# Patient Record
Sex: FEMALE | Race: WHITE | Employment: OTHER | URBAN - METROPOLITAN AREA
[De-identification: names, ages, dates, MRNs, and addresses within clinical notes are randomized per-mention and may not be internally consistent; named-entity substitution may affect disease eponyms.]

---

## 2022-10-21 NOTE — PROGRESS NOTES
[facility]  [address] [facility] Satish Bowden, [address] [place] [address] Court, [address] [facility]  [phone]    Patient Name:  Julian Carrillo  YOB: 1960      Office Visit 10/24/2022    CHIEF COMPLAINT:    Chief Complaint   Patient presents with    New Patient       HISTORY OF PRESENT ILLNESS:      The patient present  for management of obstructive sleep apnea. The patient underwent a diagnostic polysomnography 10 years ago at [facility] when she lived in [address] [place] [address]. At that time she had a split-night study which indicated she had severe sleep apnea with AHI of 76.5/hour and RDI of 85.9/hour. Lowest oxygen saturation was 87%. Subsequently she was titrated to CPAP up to 9 cm. The patient is using and benefiting from her CPAP on a daily basis on her download indicated 100% compliance. She has been using the AirTouch mask and using the blue cloth cover to help with minimize air leak. Her machine is broken beyond repair and needs urgent replacement. She indicated that the symptoms of sleep apnea including snoring and witnessed apnea and headaches and waking up with a dry mouth most of which have improved since she is still using the CPAP. She also was dealing with trouble concentrating and remembering and was diagnosed with the ADD for which she has been using Adderall. This has improved somewhat. She indicated that she go to sleep around 10 PM and wake up around 5 AM.  She has no problem with initiating or maintaining sleep while she is on the CPAP. Since her CPAP broke down she has multiple awakening and she is extremely anxious about going to sleep because of fear of dying when she is sleeping. The Essex score  today is 15 out of 24 indicating significant daytime sleepiness. There is no history of cataplexy, hypnagogic hallucinations, or sleep paralysis.   In addition, there is no history of frequent leg movements, kicking at night, or an inability to keep the legs still.  She takes a nap in the afternoon in general.  She used to work as a  before [employer] pandemic. She is trying to get established with the primary care physician in this area but has a great difficulty doing so. Sleepiness Scale:               History reviewed. No pertinent past medical history. Patient Active Problem List   Diagnosis    VARUN on CPAP    Hypersomnia    RLS (restless legs syndrome)         History reviewed. No pertinent surgical history. [unfilled]    Social History     Socioeconomic History    Marital status: Single     Spouse name: Not on file    Number of children: Not on file    Years of education: Not on file    Highest education level: Not on file   Occupational History    Not on file   Tobacco Use    Smoking status: Former     Packs/day: 0.50     Years: 40.00     Pack years: 20.00     Types: Cigarettes     Quit date: 2019     Years since quittin.8    Smokeless tobacco: Never   Substance and Sexual Activity    Alcohol use: Not Currently    Drug use: Never    Sexual activity: Not on file   Other Topics Concern    Not on file   Social History Narrative    Not on file     Social Determinants of Health     Financial Resource Strain: Not on file   Food Insecurity: Not on file   Transportation Needs: Not on file   Physical Activity: Not on file   Stress: Not on file   Social Connections: Not on file   Intimate Partner Violence: Not on file   Housing Stability: Not on file         History reviewed. No pertinent family history. Allergies   Allergen Reactions    Penicillins Other (See Comments)     Per patients parents         Current Outpatient Medications   Medication Sig    ALPRAZolam (XANAX) 0.5 MG tablet Take 0.5 mg by mouth daily as needed. amphetamine-dextroamphetamine (ADDERALL) 20 MG tablet Take 1 tablet by mouth daily. celecoxib (CELEBREX) 200 MG capsule Take 200 mg by mouth daily    gabapentin (NEURONTIN) 300 MG capsule Take by mouth. traMADol (ULTRAM) 50 MG tablet Take 50 mg by mouth every 6 hours as needed. No current facility-administered medications for this visit. REVIEW OF SYSTEMS:   CONSTITUTIONAL:   There is no history of fever, chills, night sweats, weight loss, weight gain, persistent fatigue, or lethargy/hypersomnolence. EYES:   Denies problems with eye pain, erythema, blurred vision, or visual field loss. ENTM:   Denies history of tinnitus, epistaxis, sore throat, hoarseness, or dysphonia. LYMPH:   Denies swollen glands. CARDIAC:   No chest pain, pressure, discomfort, palpitations, orthopnea, murmurs, or edema. GI:   No dysphagia, heartburn reflux, nausea/vomiting, diarrhea, abdominal pain, or bleeding. :   Denies history of dysuria, hematuria, polyuria, or decreased urine output. MS:   No history of myalgias, arthralgias, bone pain, or muscle cramps. SKIN:   No history of rashes, jaundice, cyanosis, nodules, or ulcers. ENDO:   Negative for heat or cold intolerance. No history of DM. PSYCH:   Negative for anxiety, depression, insomnia, hallucinations. NEURO:   There is no history of AMS, persistent headache, decreased level of consciousness, seizures, or motor or sensory deficits. PHYSICAL EXAM:    Vitals:    10/24/22 1519   BP: 128/76   Pulse: 72   Resp: 18   Temp: 97.4 °F (36.3 °C)   SpO2: 98%        GENERAL APPEARANCE:   The patient is normal weight and in no respiratory distress. HEENT:   PERRL. Conjunctivae unremarkable. Nasal mucosa is without epistaxis, exudate, or polyps. Gums and dentition are unremarkable. There is severe oropharyngeal narrowing. She is Mallampati 4   NECK/LYMPHATIC:   Symmetrical with no elevation of jugular venous pulsation. Trachea midline. No thyroid enlargement. No cervical adenopathy. LUNGS:   Normal respiratory effort with symmetrical lung expansion. Breath sounds are diminished in the bases. HEART:   There is a regular rate and rhythm.   No murmur, rub, or gallop. There is no edema in the lower extremities. ABDOMEN:   Soft and non-tender. No hepatosplenomegaly. Bowel sounds are normal.     SKIN:   There are no rashes, cyanosis, jaundice, or ecchymosis present. EXTREMITIES:   The extremities are unremarkable without clubbing, cyanosis, joint inflammation, degenerative, or ischemic change. MUSCULOSKELETAL:   There is no abnormal tone, muscle atrophy, or abnormal movement present. NEURO:   The patient is alert and oriented to person, place, and time. Memory appears intact and mood is normal.  No gross sensorimotor deficits are present. DIAGNOSTIC TESTS:      8/13/2012          ASSESSMENT:  (Medical Decision Making)         ICD-10-CM    1. VARUN on CPAP , very severe and improved the CPAP at 19 cm. Her machine is broken beyond repair and needs replacement. Replacement machine will be ordered with the setting of 18-20 cm with EPR 3    She would be established with a local DME provider for mask fitting and CPAP set up urgently. The pathophysiology of obstructive sleep apnea was reviewed with the patient. G47.33 DME - DURABLE MEDICAL EQUIPMENT    Z99.89       2. Hypersomnia , likely related to suboptimal treatment of her sleep apnea at this point along with the poor quality of sleep from sleep disruption G47.10 DME - DURABLE MEDICAL EQUIPMENT      3. RLS (restless legs syndrome), this being treated with gabapentin and tramadol as needed. Patient is very cautious about using the medication and she does not use them concurrently. I discussed with her the significant drug interaction between tramadol and several adjunct other agent including antianxiety such as SSRI and SNRI and dextromethorphan which can promote serotonin syndrome. G25.81              PLAN:    Replacement machine for this patient will be ordered with the setting of 18-20 cm with EPR 3. Ramp time will be 15 minutes of starting a centimeter.     Proper sleep hygiene and positional therapy are recommended    She would be set up for urgent appointment with the DME company for mask fitting and CPAP set up    Appropriate handout regarding sleep hygiene, sleep education and CPAP are provided to the patient for further review    Should be tentatively scheduled for follow-up in the sleep center in 3 months or sooner if needed    All questions and concerns were addressed properly      Orders Placed This Encounter   Procedures    DME - [address] [facility]    [facility]  Phone: [phone] [address]  Dept: [phone]      Patient Name: Austin Clifton  : 1960  Gender: female  Address: [address] [place]   Patient phone number: [phone] (home)       Primary Insurance: Payor: [address] [place] [address] Ave / Plan: [facility] / Product Type: *No Product type* /   Subscriber ID: [id number] - (Commercial)      AMB Supply Order  Order Details     DME Location:     Order Date: 10/24/2022   Diagnoses of VARUN on CPAP, Hypersomnia, and RLS (restless legs syndrome) were pertinent to this visit. (  X   )New Set-Up (replacement machine)       machine   (     )[id number] CPAP Unit  (  x   ) Auto CPAP Unit  (     ) BiLevel Unit  (     ) Auto BiLevel Unit  (     ) ASV   (     ) Bilevel ST    (     ) Oxygen Concentrator         Length of need: 12 months    Pressure: 18-20  cmH20  EPR: 3     Starting Ramp Pressure:  8 cm H20  Ramp Time: 15 min       Patient had a diagnostic Apnea Hypopnea Index (AHI) of :       *SUPPLIES* Replace all as needed, or per coverage guidelines     Masks Type: patient uses AirTouch mask.     (  x   ) -Full Face Mask (1 per 3 mon)  ( x    ) -Full Mask (1 per month) Interface/Cushion      (     ) -Nasal Mask (1 per 3 mon)  (     ) - Nasal Mask (1 per month) Interface/Cushion  (     ) -Pillow (2 per mon)  (     ) -Zbuxggkwo (1 per 6 mon)      _________________________________________________________________          Other Supplies:    (  X   )-Pqqtpxuh (1 per 6 mon)  ( X    )-Cdfcup Tubing (1 per 3 mon)  (  X   )- Disposable Filter (2 per mon)  (   X  )-Bvmbis Humidifier (1 per year)     (  x   )-Gmtqsxhyn (sometimes used with Full Face Mask) (1 per 6 mos)  (     )-Tubing-without heat (1 per 3 mos)  ( X   )-Non-Disposable Filter (1 per 6 mos)  (   x  )-Water Chamber (1 per 6 mos)  (     )-Humidifier non-heated (1 per 5 yrs)      Signed Date: 10/24/2022  Electronically Signed By: Mariusz Acevedo MD               No orders of the defined types were placed in this encounter. Over 50% of today's office visit was spent in face to face time reviewing test results, prognosis, importance of compliance, education about disease process, benefits of medications, instructions for management of acute flare-ups, and follow up plans. Total face to face time spent with patient was 45 minutes.     Mariusz Acevedo MD  Electronically signed

## 2022-10-24 ENCOUNTER — OFFICE VISIT (OUTPATIENT)
Dept: SLEEP MEDICINE | Age: 62
End: 2022-10-24
Payer: COMMERCIAL

## 2022-10-24 VITALS
BODY MASS INDEX: 38.03 KG/M2 | TEMPERATURE: 97.4 F | OXYGEN SATURATION: 98 % | WEIGHT: 242.3 LBS | RESPIRATION RATE: 18 BRPM | DIASTOLIC BLOOD PRESSURE: 76 MMHG | HEIGHT: 67 IN | SYSTOLIC BLOOD PRESSURE: 128 MMHG | HEART RATE: 72 BPM

## 2022-10-24 DIAGNOSIS — G25.81 RLS (RESTLESS LEGS SYNDROME): ICD-10-CM

## 2022-10-24 DIAGNOSIS — G47.33 OSA ON CPAP: ICD-10-CM

## 2022-10-24 DIAGNOSIS — G47.10 HYPERSOMNIA: ICD-10-CM

## 2022-10-24 DIAGNOSIS — Z99.89 OSA ON CPAP: ICD-10-CM

## 2022-10-24 PROCEDURE — 99204 OFFICE O/P NEW MOD 45 MIN: CPT | Performed by: INTERNAL MEDICINE

## 2022-10-24 RX ORDER — GABAPENTIN 300 MG/1
CAPSULE ORAL
COMMUNITY
Start: 2022-04-18

## 2022-10-24 RX ORDER — DEXTROAMPHETAMINE SACCHARATE, AMPHETAMINE ASPARTATE, DEXTROAMPHETAMINE SULFATE AND AMPHETAMINE SULFATE 5; 5; 5; 5 MG/1; MG/1; MG/1; MG/1
1 TABLET ORAL DAILY
COMMUNITY
Start: 2022-04-18

## 2022-10-24 RX ORDER — CELECOXIB 200 MG/1
200 CAPSULE ORAL DAILY
COMMUNITY
Start: 2022-04-14

## 2022-10-24 RX ORDER — ALPRAZOLAM 0.5 MG/1
0.5 TABLET ORAL DAILY PRN
COMMUNITY
Start: 2022-03-24

## 2022-10-24 RX ORDER — TRAMADOL HYDROCHLORIDE 50 MG/1
50 TABLET ORAL EVERY 6 HOURS PRN
COMMUNITY
Start: 2022-03-24

## 2022-10-24 ASSESSMENT — SLEEP AND FATIGUE QUESTIONNAIRES
HOW LIKELY ARE YOU TO NOD OFF OR FALL ASLEEP IN A CAR, WHILE STOPPED FOR A FEW MINUTES IN TRAFFIC: 0
HOW LIKELY ARE YOU TO NOD OFF OR FALL ASLEEP WHILE SITTING INACTIVE IN A PUBLIC PLACE: 2
HOW LIKELY ARE YOU TO NOD OFF OR FALL ASLEEP WHILE WATCHING TV: 3
ESS TOTAL SCORE: 15
HOW LIKELY ARE YOU TO NOD OFF OR FALL ASLEEP WHILE SITTING AND TALKING TO SOMEONE: 0
HOW LIKELY ARE YOU TO NOD OFF OR FALL ASLEEP WHILE LYING DOWN TO REST IN THE AFTERNOON WHEN CIRCUMSTANCES PERMIT: 3
HOW LIKELY ARE YOU TO NOD OFF OR FALL ASLEEP WHEN YOU ARE A PASSENGER IN A CAR FOR AN HOUR WITHOUT A BREAK: 3
HOW LIKELY ARE YOU TO NOD OFF OR FALL ASLEEP WHILE SITTING QUIETLY AFTER LUNCH WITHOUT ALCOHOL: 1
HOW LIKELY ARE YOU TO NOD OFF OR FALL ASLEEP WHILE SITTING AND READING: 3

## 2022-10-24 NOTE — PATIENT INSTRUCTIONS
examples include strips or disks that you use on your nose. Do not smoke. Smoking can make snoring worse. If you need help quitting, talk to your doctor about stop-smoking programs and medicines. These can increase your chances of quitting for good. Raise the head of your bed 4 to 6 inches by putting bricks under the legs of the bed. This may prevent your tongue from falling toward the back of the throat, which can make a blocked or narrow airway worse. Putting pillows under your head will not help. When should you call for help? Watch closely for changes in your health, and be sure to contact your doctor if:  You snore, and you feel sleepy during the day. Your sleeping partner or you notice that you gasp, choke, or stop breathing during sleep. You do not get better as expected. Where can you learn more? Go to [employer]  Enter J563 in the search box to learn more about \"Snoring: After Your Visit. \"   © 8818-0656 Healthwise, Incorporated. Care instructions adapted under license by [employer] (which disclaims liability or warranty for this information). This care instruction is for use with your licensed healthcare professional. If you have questions about a medical condition or this instruction, always ask your healthcare professional. [address] any warranty or liability for your use of this information. Content Version: 14.8.874530; Current as of: September 9, 2014            Sleep Studies: About This Test    What is it? Sleep studies are tests that watch what happens to your body during sleep. These studies usually are done in a sleep lab. Sleep labs are often located in hospitals. But sleep studies also can be done with portable equipment that you use at home. Why is this test done? Sleep studies are done to find sleep problems, including:  Sleep apnea or excessive snoring. Insomnia. Narcolepsy. Heart rhythm problems.   Repeated muscle twitching of the feet, arms, or legs while you sleep. Shift work sleep disorder. How can you prepare for the test?  You may be asked to keep a sleep diary for 1 to 2 weeks before your sleep study. Don't take any naps for 2 to 3 days before your test.  You may be asked to avoid food or drinks with caffeine for a day or two before your test.  Take a shower or bath before your test, but don't use sprays, oils, or gels on your hair. Don't wear makeup, fingernail polish, or fake nails. Pack and take along a small overnight bag with personal items, such as a toothbrush, a comb, favorite pillows or blankets, and a book. You can wear your own nightclothes. Should I take my medications? Yes, ,unless specifically instructed by her physician taken medications as usual.  Also bring any medication. He would need during the night or early in the morning. The sleep Center, does not provide medication or snacks. It is important for the sleep gestational to know what medication she was taking this many medications can affect your sleep. What happens during the test?  In the sleep lab, you will be in a private room, much like a hotel room. There may be a waiting period. You may read, watch TV, or relax during this time. If you have a commitment in the morning, be sure to inform the technician so they will be able to make sure you are out early enough in the morning. Otherwise, you can expect to be discharged between 6:00 - 6:30 in the morning. Small pads or patches called electrodes will be placed on your head and body with a small amount of glue and tape. These will record things like brain activity, eye movement, oxygen levels, and snoring. Soft elastic belts will be placed around your chest and belly to measure your breathing. Your blood oxygen levels will be checked by a small clip (oximeter) placed either on the tip of your index finger or on your earlobe.   If you have sleep apnea, you may wear a mask that is connected to a continuous positive airway pressure (CPAP) machine. What else should you know about the test?  It may feel odd to be hooked to the sleep study equipment. The sleep lab technician understands that your sleep may not be the same as it is at home because of the equipment. Try to relax and make yourself as comfortable as possible. After your sleep problem has been identified, you may need a second study if your doctor orders treatment such as CPAP. Portable sleep study equipment is available for a person to do sleep studies at home. This may be a choice for people who have problems sleeping in a sleep lab. But home sleep studies may not give the same results as a sleep lab. How long does the test take? You will stay in the sleep lab overnight. Parking is available in the visitor's lot      Will I be able to get out of bed to use the restroom? Yes. All you have to do is wave your hands are call out  And the  Technologist will unhook the ponytail of wires from the box and you will be free to use the restroom. What happens after the test?  You will be able to go home right away. You can go back to your usual activities right away. Follow-up care is a key part of your treatment and safety. Be sure to make and go to all appointments, and call your doctor if you are having problems. It's also a good idea to keep a list of the medicines you take. Ask your doctor when you can expect to have your test results. Where can you learn more? Go to [employer]It.be  Enter R606 in the search box to learn more about \"Sleep Studies: About This Test.\"   © 9994-6657 Healthwise, Incorporated. Care instructions adapted under license by [facility] (which disclaims liability or warranty for this information).  This care instruction is for use with your licensed healthcare professional. If you have questions about a medical condition or this instruction, always ask your healthcare professional. [address] any warranty or liability for your use of this information. Content Version: 18.0.352058; Current as of: September 9, 2014    Revisions 7/29/2015          Learning About Sleeping Well  What does sleeping well mean? Sleeping well means getting enough sleep. How much sleep is enough varies among people. The number of hours you sleep is not as important as how you feel when you wake up. If you do not feel refreshed, you probably need more sleep. Another sign of not getting enough sleep is feeling tired during the day. The average total nightly sleep time is 7½ to 8 hours. Healthy adults may need a little more or a little less than this. Why is getting enough sleep important? Getting enough quality sleep is a basic part of good health. When your sleep suffers, your mood and your thoughts can suffer too. You may find yourself feeling more grumpy or stressed. Not getting enough sleep also can lead to serious problems, including injury, accidents, anxiety, and depression. What might cause poor sleeping? Many things can cause sleep problems, including:  Stress. Stress can be caused by fear about a single event, such as giving a speech. Or you may have ongoing stress, such as worry about work or school. Depression, anxiety, and other mental or emotional conditions. Changes in your sleep habits or surroundings. This includes changes that happen where you sleep, such as noise, light, or sleeping in a different bed. It also includes changes in your sleep pattern, such as having jet lag or working a late shift. Health problems, such as pain, breathing problems, and restless legs syndrome. Lack of regular exercise. How can you help yourself? Here are some tips that may help you sleep more soundly and wake up feeling more refreshed. Your sleeping area   Use your bedroom only for sleeping and sex. A bit of light reading may help you fall asleep.  But if it doesn't, do your reading elsewhere in the house. Don't watch TV in bed. Be sure your bed is big enough to stretch out comfortably, especially if you have a sleep partner. Keep your bedroom quiet, dark, and cool. Use curtains, blinds, or a sleep mask to block out light. To block out noise, use earplugs, soothing music, or a \"white noise\" machine. Your evening and bedtime routine   Get regular exercise, but not within 3 to 4 hours before your bedtime. Create a relaxing bedtime routine. You might want to take a warm shower or bath, listen to soothing music, or drink a cup of noncaffeinated tea. Go to bed at the same time every night. And get up at the same time every morning, even if you feel tired. What to avoid   Limit caffeine (coffee, tea, caffeinated sodas) during the day, and don't have any for at least 4 to 6 hours before bedtime. Don't drink alcohol before bedtime. Alcohol can cause you to wake up more often during the night. Don't smoke or use tobacco, especially in the evening. Nicotine can keep you awake. Don't take naps during the day, especially close to bedtime. Don't lie in bed awake for too long. If you can't fall asleep, or if you wake up in the middle of the night and can't get back to sleep within 15 minutes or so, get out of bed and go to another room until you feel sleepy. Don't take medicine right before bed that may keep you awake or make you feel hyper or energized. Your doctor can tell you if your medicine may do this and if you can take it earlier in the day. If you can't sleep   Imagine yourself in a peaceful, pleasant scene. Focus on the details and feelings of being in a place that is relaxing. Get up and do a quiet or boring activity until you feel sleepy. Don't drink any liquids after 6 p.m. if you wake up often because you have to go to the bathroom. Where can you learn more?    Go to [employer]  Enter D709 in the search box to learn more about \"Learning About Sleeping Well. \"   © 5686-2076 Healthwise, Incorporated. Care instructions adapted under license by [facility] (which disclaims liability or warranty for this information). This care instruction is for use with your licensed healthcare professional. If you have questions about a medical condition or this instruction, always ask your healthcare professional. [address] any warranty or liability for your use of this information. Content Version: 09.8.630303; Current as of: November 14, 2014           Sleep Hygiene Instructions    Sleep only as much as you need to feel refreshed during the following day. Restricting your time in bed helps to consolidate and deepen your sleep. Excessively long times in bed lead to fragmented and shallow sleep. Get up at your regular time the next day, no matter how little your slept. Get up at the same time each day, 7 days a week. A regular wake time in the morning leads to regular times on sleep onset, and helps to set your biological clock. Exercise regularly. Schedule exercise times so that they do not occur within 3 hours of when you intend to go to bed. Exercise makes it easier to initiate sleep and deepen sleep. Don't take your problems to bed. Plan some time earlier in the evening for working on your problems or planning the next day's activities. Worrying may interfere with initiating sleep and produce shallow sleep. Train yourself to use the bedroom only for sleep and sexual activity. This will help condition your brain to see bed as the place for sleeping. Do not read, watch TV or eat in bed. Do not try and fall asleep. This only makes the problem worse. Instead, turn on the light, leave the bedroom, and do something different like reading a book. Don't engage in stimulating activity. Return to bed only when you feel sleepy. Avoid long naps. Staying awake during the day helps to fall asleep at night.   Naps totalling more than 30 minutes increase your chances of having trouble sleeping at night. Make sure that your bedroom is comfortable and free from light and noise. A comfortable, noise-free sleep environment will reduce the likelihood that you will wake up during the night. Noise that does not awaken you may disturb the quality of your sleep. Carpeting, insulated curtains, and closing the door may help. Make sure that your bedroom is at a comfortable temperature during the night. Excessively warm or cold sleep environments may disturb sleep. Eat regular meals and di not go to bed hungry. Hunger may disturb sleep. A light snack at bedtime (especially carbohydrates) may help sleep, but avoid greasy or heavy foods. Avoid excessive liquids in the evening. Reducing liquid intake will minimize the need for night-time trips to the bathroom. Cut down on all caffeine products. Caffeinated beverages and food (Coffee, tea, cola, chocolate) can cause difficulty falling asleep, awakenings during the night, and shallow sleep. Even caffeine early in the day can disrupt night-time sleep. Avoid alcohol, especially in the evening. Although alcohol helps tense people fall asleep more easily, it causes awakenings later in the night. Smoking may disturb sleep. Nicotine is a stimulant. Try not to smoke during the night when you have trouble sleeping. Learning about Sleeping Well    What does sleeping well mean? Sleeping well means getting enough sleep. How much sleep is enough varies among people. The number of hours you sleep is not as improtant as how you feel when you wake up. If you do not feel refreshed, you probably need more sleep. Another sign of not getting enough sleep is feeling tired during the day. The average totally nightly sleep time is 7 1/2 to 8 hours. Healthy adults may need a little more or a little less than this. Why is getting enough sleep important?     Getting enough quality sleep is a basic part of good health. When your sleep suffers, your mood and your thoughts can suffer too. Your thoughts can suffer too. You ma find yourself feeling more grumpy or stressed. No getting enough sleep also can lead to serious problems, including injury, accidents, anxiety, and depression. What might cause poor sleeping? Many things can cause sleep problems, including:    Stress. Stress can be caused by fear about a single event, such as giving a speech. Or you may have ongoing stress, such as worry about work or school. Depression, anxiety, and other mental or emotional conditions. Changes in your sleep habits or surroundings. This includes changes that happen where you sleep, such as noise, light, or sleeping in a different bed. It also includes changes in your sleep pattern, such as having jet lab or working a late shift. Health problems, such as pain, breathing problems, and restless legs syndrome. Lack of regular exercise. How can you help yourself? Here are some tips that may help you sleep more soundly and wake up feeling more refreshed. Your sleeping area    Use your bedroom only for sleeping and sex. A bit of light reading may help you fall asleep. But if it doesn't, do your reading elsewhere in the house. Don't watch TV in bed. Be sure your bed is big enough to stretch out comfortable, especially if you have a sleep partner. Keep your bedroom quiet, dark, and cool. Use curtains, blinds, or sleep mask to block out light. To block out noise, use earplugs, soothing music, or a \"white noise\" machine. Your evening and bedtime routine    Create a relaxing bedtime routine. You might want to take a warm shower or bath, listen to soothing music, or drink a cup of non-caffeinated tea. Go to bed at the same time every night. And get up at the same time every morning, even if you feel tired.     What to avoid:    Limit caffeine (coffee, tea, caffeinated sodas) during the day, and dont have any for at least 4 to 6 hours before bedtime. Don't drink alcohol before bedtime. Alcohol can cause you to wake up more often during the night. Don't smoke or use tobacco, especially in the evening. Nicotine can keep you awake. Don't like in bed away for too long. If you can't fall asleep, or if you wake up in the middle of the night and can't get back to sleep within 15 minutes or so, get out of bed and go to another room until you feel sleepy. Don't take medicine right before bed that may keep you awake or make you feel hyper or enegerized. Your doctor can tell you if your medicine may do this and if you can take it earlier in the day. If you can't sleep:    Imagine yourself in a peaceful, pleasant scene. Focus on the details and feelings of being in a place that is relaxing. Get up and do a quiet or boring activity until you feel sleepy. Don't drink liquids after 6 p.m. if you wake up often because you have to go to the bathroom. Where can you learn more? Go to http://www.gray.com/    Enter D802 in the search box to learn more about \"Learning About Sleeping Well. \"

## 2022-10-25 ENCOUNTER — TELEPHONE (OUTPATIENT)
Dept: SLEEP MEDICINE | Age: 62
End: 2022-10-25

## 2022-10-28 ENCOUNTER — TELEPHONE (OUTPATIENT)
Dept: SLEEP MEDICINE | Age: 62
End: 2022-10-28

## 2022-10-28 NOTE — TELEPHONE ENCOUNTER
Spoke with pt to let her know that per Dr Meche Curry we are going to adjust her pressure to Apap 15-20cm/h2o. Pt is agreeable.

## 2022-11-07 ENCOUNTER — HOSPITAL ENCOUNTER (EMERGENCY)
Dept: GENERAL RADIOLOGY | Age: 62
Discharge: HOME | End: 2022-11-10
Payer: COMMERCIAL

## 2022-11-07 ENCOUNTER — HOSPITAL ENCOUNTER (EMERGENCY)
Dept: CT IMAGING | Age: 62
Discharge: HOME | End: 2022-11-10
Payer: COMMERCIAL

## 2022-11-07 ENCOUNTER — HOSPITAL ENCOUNTER (EMERGENCY)
Age: 62
Discharge: HOME | End: 2022-11-07
Attending: EMERGENCY MEDICINE
Payer: COMMERCIAL

## 2022-11-07 VITALS
BODY MASS INDEX: 37.98 KG/M2 | DIASTOLIC BLOOD PRESSURE: 64 MMHG | RESPIRATION RATE: 20 BRPM | OXYGEN SATURATION: 96 % | HEIGHT: 67 IN | SYSTOLIC BLOOD PRESSURE: 166 MMHG | TEMPERATURE: 98.7 F | WEIGHT: 242 LBS | HEART RATE: 74 BPM

## 2022-11-07 DIAGNOSIS — R07.9 CHEST PAIN, UNSPECIFIED TYPE: Primary | ICD-10-CM

## 2022-11-07 DIAGNOSIS — E04.1 THYROID NODULE: ICD-10-CM

## 2022-11-07 LAB
ALBUMIN SERPL-MCNC: 3.5 G/DL (ref 3.2–4.6)
ALBUMIN/GLOB SERPL: 0.9 {RATIO} (ref 0.4–1.6)
ALP SERPL-CCNC: 117 U/L (ref 50–130)
ALT SERPL-CCNC: 73 U/L (ref 12–65)
ANION GAP SERPL CALC-SCNC: 6 MMOL/L (ref 2–11)
AST SERPL-CCNC: 106 U/L (ref 15–37)
BILIRUB SERPL-MCNC: 0.4 MG/DL (ref 0.2–1.1)
BUN SERPL-MCNC: 21 MG/DL (ref 8–23)
CALCIUM SERPL-MCNC: 10.2 MG/DL (ref 8.3–10.4)
CHLORIDE SERPL-SCNC: 109 MMOL/L (ref 101–110)
CO2 SERPL-SCNC: 26 MMOL/L (ref 21–32)
CREAT SERPL-MCNC: 0.95 MG/DL (ref 0.6–1)
D DIMER PPP FEU-MCNC: 0.9 UG/ML(FEU)
ERYTHROCYTE [DISTWIDTH] IN BLOOD BY AUTOMATED COUNT: 13.2 % (ref 11.9–14.6)
GLOBULIN SER CALC-MCNC: 4 G/DL (ref 2.8–4.5)
GLUCOSE SERPL-MCNC: 123 MG/DL (ref 65–100)
HCT VFR BLD AUTO: 40.5 % (ref 35.8–46.3)
HGB BLD-MCNC: 13.8 G/DL (ref 11.7–15.4)
MCH RBC QN AUTO: 32.1 PG (ref 26.1–32.9)
MCHC RBC AUTO-ENTMCNC: 34.1 G/DL (ref 31.4–35)
MCV RBC AUTO: 94.2 FL (ref 82–102)
NRBC # BLD: 0 K/UL (ref 0–0.2)
PLATELET # BLD AUTO: 270 K/UL (ref 150–450)
PMV BLD AUTO: 10.5 FL (ref 9.4–12.3)
POTASSIUM SERPL-SCNC: 4.1 MMOL/L (ref 3.5–5.1)
PROT SERPL-MCNC: 7.5 G/DL (ref 6.3–8.2)
RBC # BLD AUTO: 4.3 M/UL (ref 4.05–5.2)
SODIUM SERPL-SCNC: 141 MMOL/L (ref 133–143)
TROPONIN I SERPL HS-MCNC: 5.3 PG/ML (ref 0–14)
TROPONIN I SERPL HS-MCNC: 6 PG/ML (ref 0–14)
WBC # BLD AUTO: 17.1 K/UL (ref 4.3–11.1)

## 2022-11-07 PROCEDURE — 99285 EMERGENCY DEPT VISIT HI MDM: CPT | Performed by: EMERGENCY MEDICINE

## 2022-11-07 PROCEDURE — 85027 COMPLETE CBC AUTOMATED: CPT

## 2022-11-07 PROCEDURE — 6370000000 HC RX 637 (ALT 250 FOR IP): Performed by: STUDENT IN AN ORGANIZED HEALTH CARE EDUCATION/TRAINING PROGRAM

## 2022-11-07 PROCEDURE — 96374 THER/PROPH/DIAG INJ IV PUSH: CPT | Performed by: EMERGENCY MEDICINE

## 2022-11-07 PROCEDURE — 80053 COMPREHEN METABOLIC PANEL: CPT

## 2022-11-07 PROCEDURE — 71260 CT THORAX DX C+: CPT

## 2022-11-07 PROCEDURE — 2580000003 HC RX 258: Performed by: STUDENT IN AN ORGANIZED HEALTH CARE EDUCATION/TRAINING PROGRAM

## 2022-11-07 PROCEDURE — 71046 X-RAY EXAM CHEST 2 VIEWS: CPT

## 2022-11-07 PROCEDURE — 85379 FIBRIN DEGRADATION QUANT: CPT

## 2022-11-07 PROCEDURE — 6360000002 HC RX W HCPCS: Performed by: STUDENT IN AN ORGANIZED HEALTH CARE EDUCATION/TRAINING PROGRAM

## 2022-11-07 PROCEDURE — 84484 ASSAY OF TROPONIN QUANT: CPT

## 2022-11-07 PROCEDURE — 6360000004 HC RX CONTRAST MEDICATION: Performed by: STUDENT IN AN ORGANIZED HEALTH CARE EDUCATION/TRAINING PROGRAM

## 2022-11-07 RX ORDER — NITROGLYCERIN 0.4 MG/1
0.4 TABLET SUBLINGUAL
Status: COMPLETED | OUTPATIENT
Start: 2022-11-07 | End: 2022-11-07

## 2022-11-07 RX ORDER — FAMOTIDINE 20 MG/1
20 TABLET, FILM COATED ORAL 2 TIMES DAILY
Qty: 60 TABLET | Refills: 0 | Status: SHIPPED | OUTPATIENT
Start: 2022-11-07 | End: 2022-12-07

## 2022-11-07 RX ORDER — SODIUM CHLORIDE 0.9 % (FLUSH) 0.9 %
10 SYRINGE (ML) INJECTION
Status: COMPLETED | OUTPATIENT
Start: 2022-11-07 | End: 2022-11-07

## 2022-11-07 RX ORDER — 0.9 % SODIUM CHLORIDE 0.9 %
100 INTRAVENOUS SOLUTION INTRAVENOUS ONCE
Status: COMPLETED | OUTPATIENT
Start: 2022-11-07 | End: 2022-11-07

## 2022-11-07 RX ORDER — ASPIRIN 325 MG
325 TABLET ORAL
Status: COMPLETED | OUTPATIENT
Start: 2022-11-07 | End: 2022-11-07

## 2022-11-07 RX ORDER — KETOROLAC TROMETHAMINE 15 MG/ML
15 INJECTION, SOLUTION INTRAMUSCULAR; INTRAVENOUS ONCE
Status: COMPLETED | OUTPATIENT
Start: 2022-11-07 | End: 2022-11-07

## 2022-11-07 RX ADMIN — SODIUM CHLORIDE 100 ML: 9 INJECTION, SOLUTION INTRAVENOUS at 19:27

## 2022-11-07 RX ADMIN — ASPIRIN 325 MG: 325 TABLET, FILM COATED ORAL at 18:24

## 2022-11-07 RX ADMIN — SODIUM CHLORIDE, PRESERVATIVE FREE 10 ML: 5 INJECTION INTRAVENOUS at 19:27

## 2022-11-07 RX ADMIN — NITROGLYCERIN 0.4 MG: 0.4 TABLET, ORALLY DISINTEGRATING SUBLINGUAL at 18:24

## 2022-11-07 RX ADMIN — IOPAMIDOL 100 ML: 755 INJECTION, SOLUTION INTRAVENOUS at 19:27

## 2022-11-07 RX ADMIN — KETOROLAC TROMETHAMINE 15 MG: 15 INJECTION, SOLUTION INTRAMUSCULAR; INTRAVENOUS at 19:55

## 2022-11-07 ASSESSMENT — ENCOUNTER SYMPTOMS
DIARRHEA: 0
CHEST TIGHTNESS: 0
COUGH: 0
SORE THROAT: 0
WHEEZING: 0
SHORTNESS OF BREATH: 1
EYE REDNESS: 0
VOMITING: 0
NAUSEA: 1
PHOTOPHOBIA: 0
VOICE CHANGE: 0
SINUS PRESSURE: 0
APNEA: 0
CONSTIPATION: 0
COLOR CHANGE: 0
ABDOMINAL PAIN: 0
EYE DISCHARGE: 0
RHINORRHEA: 0
EYE PAIN: 0
BLOOD IN STOOL: 0

## 2022-11-07 ASSESSMENT — PAIN - FUNCTIONAL ASSESSMENT: PAIN_FUNCTIONAL_ASSESSMENT: 0-10

## 2022-11-07 ASSESSMENT — PAIN SCALES - GENERAL
PAINLEVEL_OUTOF10: 9
PAINLEVEL_OUTOF10: 7

## 2022-11-07 ASSESSMENT — PAIN DESCRIPTION - LOCATION: LOCATION: BACK;CHEST

## 2022-11-07 NOTE — ED TRIAGE NOTES
Per EMS pt is coming from home complaining of pain between her shoulder blades, under her breasts and her chest. Pt states she took 2 aspirin, 2 ibuprofen and 2 adderall. Pt states pain has been going on since 230. Pt states this happened once last week for two hours and then went away.

## 2022-11-07 NOTE — ED PROVIDER NOTES
Emergency Department Provider Note                   PCP:                None Provider               Age: 65 y.o. Sex: female       ICD-10-CM    1. Chest pain, unspecified type  R07.9       2. Thyroid nodule  E04.1           DISPOSITION Decision To Discharge 11/07/2022 08:54:56 PM      ===================================  ED EKG Interpretation  EKG was interpreted in the absence of a cardiologist.    Rate: 71  EKG Interpretation: EKG Interpretation: sinus rhythm  ST Segments: Normal ST segments - NO STEMI    AVERY DEL TORO 9:03 PM     MDM  Number of Diagnoses or Management Options  Chest pain, unspecified type  Thyroid nodule  Diagnosis management comments: In summary this is a 79-year-old female patient presenting with symptoms of chest pain of uncertain cause. Based on my evaluation I feel the patient is at a low risk for alternative diagnoses such as acute coronary syndrome, pulmonary embolism, aortic dissection, esophageal rupture, cardiac tamponade, tension pneumothorax, acute abdominal pathology. The reasoning behind my medical decision-making process is that the patient is grossly well-appearing on exam in no acute distress. Vital signs are stable without fever, tachycardia, tachypnea, hypotension, hypoxia. No syncope, dyspnea on exertion, weakness, other anginal equivalents. EKG normal with 2 negative troponin. HEART score of indicating 3 indicating low risk. No pleuritic pain, hemoptysis, dyspnea, hypoxia, leg pain or swelling, malignancy, recent surgery, recent travel, oral contraceptive use to suggest pulmonary embolism. Patient did have a history of DVT and elevated D-dimer today so CT chest abdomen pelvis was performed which was unremarkable for acute pathology. There was an incidental finding of thyroid nodule which patient reports she has already had biopsy of and states it is benign. Nonobstructing stones which patient knew about.  Blood pressures equal in both arms without pulse deficits or motor or sensory deficit to suggest dissection. No widened mediastinum on x ray. No history of vomiting or recent endoscopy or mediastinal emphysema to suggest esophageal rupture. No JVD, hypotension, tracheal deviation to suggest tension pneumothorax. No penetrating trauma or uremia to suggest tamponade. Pain control was achieved with nitro and aspirin. Patient asymptomatic currently. At this time after full workup, no clear cause of chest pain, although my suspicion for aforementioned severe causes is low. I explained to the patient that the options at this point were admission for cardiac rule out or to be discharged home with outpatient cardiology follow-up and strict return precautions. Using the shared medical decision making process, the patient chose outpatient management. Referral for cardiology given. I did call the [facility] cardiology after-hours referral line to update them on this patient and establish her follow-up care. I specifically counseled the patient warning signs to return immediately for including but not limited to return of chest pain, chest pressure, dyspnea, syncope, abdominal pain. The patient has verbalized understanding and is in agreement with the treatment plan. Amount and/or Complexity of Data Reviewed  Clinical lab tests: ordered and reviewed  Tests in the radiology section of CPT®: ordered and reviewed  Independent visualization of images, tracings, or specimens: yes    Risk of Complications, Morbidity, and/or Mortality  Presenting problems: moderate  Diagnostic procedures: moderate  Management options: moderate    Patient Progress  Patient progress: improved       ED Course as of 11/07/22 2103 Mon Nov 07, 2022   1750 ===================================  ED EKG Interpretation  EKG was interpreted in the absence of a cardiologist.    Rate: 71  EKG Interpretation: Normal sinus rhythm. Normal NV and QT intervals.   Nonspecific ST-T wave abnormalities  ST Segments: Nonspecific ST segments - NO STEMI    Mik Rosenthal MD 5:50 PM    [JL]   5465 6:51 PM  Patient reports symptoms resolved after nitro and aspirin. [NR]   1916 7:16 PM  Elevated d dimer. Patient now reporting RUQ abd pain. Scanning chest, abd, pelvis. [NR]   2013 8:13 PM  CT CHEST:  1. No pulmonary embolism or other acute process in the chest.  2. Left thyroid lobe complex nodule measuring up to 3.1 cm. Recommend further  evaluation with nonemergent thyroid ultrasound. CT ABDOMEN AND PELVIS:  1. No acute process in the abdomen or pelvis. 2. Bilateral nonobstructing renal calculi. [NR]   2013 8:13 PM  HEART score of 3 indicating low risk. Outpatient management appropriate. [NR]   6859 8:21 PM  Pain is resolved. Patient requesting to leave. Advised her to wait for second troponin [NR]   2102 9:02 PM  Pain continues to be resolved. Second trop neg. She does not want to stay. She will be discharged. [facility] cardiology referral line to get patient follow up.   [NR]      ED Course User Index  [JL] Angeline Mendes MD  [NR] AVERY Benjamin        Orders Placed This Encounter   Procedures    XR CHEST (2 VW)    CT CHEST ABDOMEN PELVIS W CONTRAST Additional Contrast? None    CBC    Comprehensive Metabolic Panel    Troponin    D-Dimer, Quantitative    Cardiac Monitor    Pulse Oximetry    EKG 12 Lead    Saline lock IV        Medications   aspirin tablet 325 mg (325 mg Oral Given 11/7/22 1824)   nitroGLYCERIN (NITROSTAT) SL tablet 0.4 mg (0.4 mg SubLINGual Given 11/7/22 1824)   ketorolac (TORADOL) injection 15 mg (15 mg IntraVENous Given 11/7/22 1955)       New Prescriptions    FAMOTIDINE (PEPCID) 20 MG TABLET    Take 1 tablet by mouth 2 times daily        Glenna Bender is a 65 y.o. female who presents to the Emergency Department with chief complaint of    Chief Complaint   Patient presents with    Back Pain    Chest Pain      71-year-old female patient with no significant reported past medical history presents today complaining of chest pain that began today while at rest approximately 4 hours ago. Reports it is also in her upper back and bilateral breast.  Described as pressure. Described as constant. Moderate in severity. Reports she has tried taking ibuprofen and Adderall and aspirin today. Reports it is somewhat improving at time of interview but still present mostly in the upper shoulder blades. Reports when it initially started she had some shortness of breath and diaphoresis and nausea but the symptoms have resolved. At present time she only currently has pain. States exertion did not alter the pain. Denies pleuritic pain, hemoptysis, dyspnea, leg pain or swelling, recent travel, recent surgery, malignancy, hormone use. Reports she did have a DVT over 20 years ago after using birth control but has stopped the birth control and is not on anticoagulation. Denies pain radiating down her arms, syncope, weakness, pain in her jaw. Denies abdominal pain, diarrhea, constipation, blood in stool, melena. Patient does have a history of anxiety. Patient reports she has had similar symptoms like this in the past and has been evaluated without clear cause. No cardiologist.  Never had a stress test or catheterization. Patient is primary historian and quality seems reliable. The history is provided by the patient. No  was used. Review of Systems   Constitutional:  Positive for diaphoresis (Resolved now). Negative for appetite change, chills, fatigue, fever and unexpected weight change. HENT:  Negative for congestion, ear pain, hearing loss, postnasal drip, rhinorrhea, sinus pressure, sore throat and voice change. Eyes:  Negative for photophobia, pain, discharge, redness and visual disturbance. Respiratory:  Positive for shortness of breath (Resolved now). Negative for apnea, cough, chest tightness and wheezing. Cardiovascular:  Positive for chest pain. Negative for palpitations and leg swelling. Gastrointestinal:  Positive for nausea (Resolved now). Negative for abdominal pain, blood in stool, constipation, diarrhea and vomiting. Endocrine: Negative for polydipsia, polyphagia and polyuria. Genitourinary:  Negative for decreased urine volume, dysuria, flank pain, frequency and hematuria. Musculoskeletal:  Negative for arthralgias, joint swelling, myalgias and neck stiffness. Skin:  Negative for color change, rash and wound. Neurological:  Negative for dizziness, syncope, speech difficulty, weakness, light-headedness and headaches. Hematological:  Negative for adenopathy. Does not bruise/bleed easily. Psychiatric/Behavioral:  Negative for confusion, self-injury, sleep disturbance and suicidal ideas. All other systems reviewed and are negative. History reviewed. No pertinent past medical history. History reviewed. No pertinent surgical history. History reviewed. No pertinent family history. Social History     Socioeconomic History    Marital status: Single     Spouse name: None    Number of children: None    Years of education: None    Highest education level: None   Tobacco Use    Smoking status: Former     Packs/day: 0.50     Years: 40.00     Pack years: 20.00     Types: Cigarettes     Quit date: 2019     Years since quittin.8    Smokeless tobacco: Never   Substance and Sexual Activity    Alcohol use: Not Currently    Drug use: Never         Penicillins     Previous Medications    ALPRAZOLAM (XANAX) 0.5 MG TABLET    Take 0.5 mg by mouth daily as needed. AMPHETAMINE-DEXTROAMPHETAMINE (ADDERALL) 20 MG TABLET    Take 1 tablet by mouth daily. CELECOXIB (CELEBREX) 200 MG CAPSULE    Take 200 mg by mouth daily    GABAPENTIN (NEURONTIN) 300 MG CAPSULE    Take by mouth. TRAMADOL (ULTRAM) 50 MG TABLET    Take 50 mg by mouth every 6 hours as needed. Vitals signs and nursing note reviewed.    Patient Vitals for the past 4 hrs:   Temp Pulse Resp BP SpO2   11/07/22 1740 98.7 °F (37.1 °C) 74 20 (!) 166/64 96 %          Physical Exam  Vitals and nursing note reviewed. Constitutional:       General: She is not in acute distress. Appearance: Normal appearance. She is obese. She is not ill-appearing, toxic-appearing or diaphoretic. Comments: Pleasant and cooperative. No acute distress. Slightly anxious appearing. Even nonlabored respirations. Resting comfortably in the stretcher. HENT:      Head: Normocephalic and atraumatic. Right Ear: External ear normal.      Left Ear: External ear normal.      Nose: Nose normal.      Mouth/Throat:      Mouth: Mucous membranes are moist.      Pharynx: Oropharynx is clear. Eyes:      Extraocular Movements: Extraocular movements intact. Conjunctiva/sclera: Conjunctivae normal.      Pupils: Pupils are equal, round, and reactive to light. Neck:      Vascular: No carotid bruit. Cardiovascular:      Rate and Rhythm: Normal rate and regular rhythm. Pulses: Normal pulses. Heart sounds: Normal heart sounds, S1 normal and S2 normal. No murmur heard. Comments: No JVD. Heart sounds not muffled. No Kussmaul sign. Pulses equal in bilateral upper and lower extremities. No deficits. Blood pressures within 10mmHg on upper extremities. No reproducible chest wall tenderness. No skin color changes  Pulmonary:      Effort: Pulmonary effort is normal. No respiratory distress. Breath sounds: Normal breath sounds. No stridor. No wheezing, rhonchi or rales. Comments: No reproducible chest wall tenderness or upper back tenderness. Even nonlabored respirations. Negative egophony. Chest:      Chest wall: No tenderness. Abdominal:      General: Abdomen is flat. Bowel sounds are normal. There is no distension. Palpations: Abdomen is soft. There is no mass. Tenderness: There is no abdominal tenderness.  There is no right CVA tenderness, left CVA tenderness, guarding or rebound. Hernia: No hernia is present. Comments: No pulsatile mass. No epigastric abdominal pain. Musculoskeletal:         General: No tenderness. Normal range of motion. Cervical back: Normal range of motion and neck supple. No rigidity or tenderness. Right lower leg: No edema. Left lower leg: No edema. Comments: No bilateral lower extremity swelling or tenderness. No signs of DVT. Lymphadenopathy:      Cervical: No cervical adenopathy. Skin:     General: Skin is warm and dry. Capillary Refill: Capillary refill takes less than 2 seconds. Coloration: Skin is not jaundiced or pale. Findings: No rash. Neurological:      General: No focal deficit present. Mental Status: She is alert and oriented to person, place, and time. Mental status is at baseline. Cranial Nerves: No cranial nerve deficit. Sensory: No sensory deficit. Motor: No weakness. Coordination: Coordination normal.      Gait: Gait normal.      Deep Tendon Reflexes: Reflexes normal.   Psychiatric:         Mood and Affect: Mood normal.         Behavior: Behavior normal.        Procedures    Results for orders placed or performed during the hospital encounter of 11/07/22   XR CHEST (2 VW)    Narrative    Chest 2 view    CLINICAL INDICATION: Acute moderate to severe pain involving bilateral posterior  chest as well as the anterior chest wall, no reported trauma, leukocytosis. History of obstructive sleep apnea, hypersomnia. Labs demonstrate elevated  d-dimer. COMPARISON: None    TECHNIQUE: Upright PA  and lateral views of the chest     FINDINGS: Lung volumes are well inflated. Cardiac silhouette borderline  enlarged. Otherwise, mediastinal and hilar contours within normal limits. The  lungs are clear. No acute osseous abnormalities are seen. Impression    No acute airspace disease.      CT CHEST ABDOMEN PELVIS W CONTRAST Additional Contrast? None    Narrative    EXAMINATION: CTPA with contrast, CT abdomen and pelvis with contrast. 11/7/2022  7:22 PM    ACCESSION NUMBER: [id number]    INDICATION: chest pain radiating to back. elevated d dimer    COMPARISON: None available    TECHNIQUE: Multiple contiguous axial CT images of the chest, abdomen, and pelvis  were obtained from the thoracic inlet to the pubic symphysis after the  intravenous administration of 100 mL intravenous Isovue 370 contrast material.     Radiation dose reduction techniques were used for this study:  Our CT scanners  use one or all of the following: Automated exposure control, adjustment of the  mA and/or kVp according to patient's size, iterative reconstruction. FINDINGS:    CHEST:    LUNGS/PLEURA:  The central airways are patent. The lungs are clear without consolidation. No  suspicious pulmonary nodules. No pleural effusion or pneumothorax. No focal  pleural lesion. MEDIASTINUM:  The left thyroid lobe is enlarged with a complex nodule measuring 3.1 cm. The  thoracic esophagus is within normal limits. The heart is normal in size without  pericardial effusion. The great vessels are normal in caliber. The examination  is diagnostic evaluation for pulmonary emboli through the subsegmental level. There is no evidence of an acute or chronic pulmonary embolus. BONES AND SOFT TISSUES:   Subcutaneous soft tissues are within normal limits. No acute or aggressive  osseous abnormality. ABDOMEN/PELVIS:    LIVER: Normal    BILIARY: Gallbladder is unremarkable. No intrahepatic or extrahepatic biliary  ductal dilation. SPLEEN: No splenomegaly or concerning lesion. PANCREAS: No pancreatic duct dilation or mass. ADRENALS: No adrenal nodules or hypertrophy. URINARY: Kidneys are symmetric in size and enhancement. No suspicious renal  mass. No hydronephrosis.  There are several nonobstructing renal calculi in the  inferior pole of the right kidney, the largest of which measures up to 6 mm. There are additional punctate nonobstructing calculi in the superior pole of the  left kidney. Ureters and urinary bladder are within normal limits. BOWEL: The stomach, small bowel, and large bowel are normal in caliber. No  abnormal wall thickening. No pneumoperitoneum. VASCULAR: Abdominal aorta and iliac arterial system are not aneurysmal. The  portomesenteric system is patent. NODES: No lymphadenopathy. FLUID: No free intraperitoneal fluid. REPRODUCTIVE: Unremarkable    BONES/SOFT TISSUES: No acute or aggressive osseous abnormality. Regional soft  tissues are unremarkable. Impression    CT CHEST:  1. No pulmonary embolism or other acute process in the chest.  2. Left thyroid lobe complex nodule measuring up to 3.1 cm. Recommend further  evaluation with nonemergent thyroid ultrasound. CT ABDOMEN AND PELVIS:  1. No acute process in the abdomen or pelvis. 2. Bilateral nonobstructing renal calculi.    CBC   Result Value Ref Range    WBC 17.1 (H) 4.3 - 11.1 K/uL    RBC 4.30 4.05 - 5.2 M/uL    Hemoglobin 13.8 11.7 - 15.4 g/dL    Hematocrit 40.5 35.8 - 46.3 %    MCV 94.2 82.0 - 102.0 FL    MCH 32.1 26.1 - 32.9 PG    MCHC 34.1 31.4 - 35.0 g/dL    RDW 13.2 11.9 - 14.6 %    Platelets 793 576 - 288 K/uL    MPV 10.5 9.4 - 12.3 FL    nRBC 0.00 0.0 - 0.2 K/uL   Comprehensive Metabolic Panel   Result Value Ref Range    Sodium 141 133 - 143 mmol/L    Potassium 4.1 3.5 - 5.1 mmol/L    Chloride 109 101 - 110 mmol/L    CO2 26 21 - 32 mmol/L    Anion Gap 6 2 - 11 mmol/L    Glucose 123 (H) 65 - 100 mg/dL    BUN 21 8 - 23 MG/DL    Creatinine 0.95 0.6 - 1.0 MG/DL    Est, Glom Filt Rate >60 >60 ml/min/1.73m2    Calcium 10.2 8.3 - 10.4 MG/DL    Total Bilirubin 0.4 0.2 - 1.1 MG/DL    ALT 73 (H) 12 - 65 U/L     (H) 15 - 37 U/L    Alk Phosphatase 117 50 - 130 U/L    Total Protein 7.5 6.3 - 8.2 g/dL    Albumin 3.5 3.2 - 4.6 g/dL    Globulin 4.0 2.8 - 4.5 g/dL    Albumin/Globulin Ratio 0.9 0.4 - 1.6     Troponin   Result Value Ref Range    Troponin, High Sensitivity 6.0 0 - 14 pg/mL   Troponin   Result Value Ref Range    Troponin, High Sensitivity 5.3 0 - 14 pg/mL   D-Dimer, Quantitative   Result Value Ref Range    D-Dimer, Quant 0.90 (H) <0.56 ug/ml(FEU)   EKG 12 Lead   Result Value Ref Range    Ventricular Rate 71 BPM    Atrial Rate 71 BPM    P-R Interval 158 ms    QRS Duration 74 ms    Q-T Interval 368 ms    QTc Calculation (Bazett) 399 ms    P Axis 60 degrees    R Axis 70 degrees    T Axis 58 degrees    Diagnosis Normal sinus rhythm         CT CHEST ABDOMEN PELVIS W CONTRAST Additional Contrast? None   Final Result         CT CHEST:   1. No pulmonary embolism or other acute process in the chest.   2. Left thyroid lobe complex nodule measuring up to 3.1 cm. Recommend further   evaluation with nonemergent thyroid ultrasound. CT ABDOMEN AND PELVIS:   1. No acute process in the abdomen or pelvis. 2. Bilateral nonobstructing renal calculi. XR CHEST (2 VW)   Final Result   No acute airspace disease. Voice dictation software was used during the making of this note. This software is not perfect and grammatical and other typographical errors may be present. This note has not been completely proofread for errors.      Heber Days, [address]  11/07/22 3017

## 2022-11-08 LAB
EKG ATRIAL RATE: 71 BPM
EKG DIAGNOSIS: NORMAL
EKG P AXIS: 60 DEGREES
EKG P-R INTERVAL: 158 MS
EKG Q-T INTERVAL: 368 MS
EKG QRS DURATION: 74 MS
EKG QTC CALCULATION (BAZETT): 399 MS
EKG R AXIS: 70 DEGREES
EKG T AXIS: 58 DEGREES
EKG VENTRICULAR RATE: 71 BPM

## 2022-11-08 NOTE — DISCHARGE INSTRUCTIONS
Your EKG did not show signs of a heart attack today. Your troponin did not show any signs of elevation. This is an enzyme that your heart releases when it has been damaged. Your CT of your chest and abdomen did not reveal any acute abdominal pathology or blood clots in your lungs. Today we discussed admission for observation regarding your chest pain. You have decided to decline admission and follow-up with a cardiologist on an outpatient basis. It is important that you call the cardiologist number tomorrow to schedule an appointment to be seen for further work-up and management of your chest pain. It is possible your pain is caused by heartburn. We will trial a course of antacid medication. If your chest pain returns, you must return here immediately for evaluation. We would love to help you get a primary care doctor for follow-up after your emergency department visit. Please call [phone] between 7AM - 6PM Monday to Friday. A care navigator will be able to assist you with setting up a doctor close to your home. As we discussed, I did not find a life threatening cause of your symptoms today. However, THAT DOES NOT MEAN IT COULD NOT DEVELOP. If you develop ANY new or worsening symptoms, it is critical that you return for re-evaluation. This includes any symptoms that are concerning to you, especially symptoms such as chest pain, shortness of breath, abdominal pain. If you do not return for re-evaluation, you risk serious complications, including death.

## 2022-11-08 NOTE — ED NOTES
I have reviewed discharge instructions with the patient. The patient verbalized understanding. Patient left ED via Discharge Method: ambulatory to Home with self. Opportunity for questions and clarification provided. Patient given 1 scripts. To continue your aftercare when you leave the hospital, you may receive an automated call from our care team to check in on how you are doing. This is a free service and part of our promise to provide the best care and service to meet your aftercare needs.  If you have questions, or wish to unsubscribe from this service please call [phone]. Thank you for Choosing our [address] [place] [address] Emergency Department.         Mariella Duverney, RN  11/07/22 5002

## 2022-12-10 ENCOUNTER — HOSPITAL ENCOUNTER (EMERGENCY)
Age: 62
Discharge: HOME | End: 2022-12-10
Attending: EMERGENCY MEDICINE
Payer: COMMERCIAL

## 2022-12-10 ENCOUNTER — HOSPITAL ENCOUNTER (EMERGENCY)
Dept: GENERAL RADIOLOGY | Age: 62
End: 2022-12-10
Payer: COMMERCIAL

## 2022-12-10 VITALS
HEIGHT: 67 IN | HEART RATE: 71 BPM | RESPIRATION RATE: 20 BRPM | WEIGHT: 240 LBS | TEMPERATURE: 99 F | DIASTOLIC BLOOD PRESSURE: 79 MMHG | BODY MASS INDEX: 37.67 KG/M2 | OXYGEN SATURATION: 96 % | SYSTOLIC BLOOD PRESSURE: 160 MMHG

## 2022-12-10 DIAGNOSIS — R10.13 ABDOMINAL PAIN, EPIGASTRIC: Primary | ICD-10-CM

## 2022-12-10 LAB
ALBUMIN SERPL-MCNC: 3.6 G/DL (ref 3.2–4.6)
ALBUMIN/GLOB SERPL: 0.9 {RATIO} (ref 0.4–1.6)
ALP SERPL-CCNC: 105 U/L (ref 50–130)
ALT SERPL-CCNC: 28 U/L (ref 12–65)
ANION GAP SERPL CALC-SCNC: 4 MMOL/L (ref 2–11)
AST SERPL-CCNC: 16 U/L (ref 15–37)
BILIRUB SERPL-MCNC: 0.3 MG/DL (ref 0.2–1.1)
BUN SERPL-MCNC: 16 MG/DL (ref 8–23)
CALCIUM SERPL-MCNC: 9.7 MG/DL (ref 8.3–10.4)
CHLORIDE SERPL-SCNC: 110 MMOL/L (ref 101–110)
CO2 SERPL-SCNC: 27 MMOL/L (ref 21–32)
CREAT SERPL-MCNC: 0.73 MG/DL (ref 0.6–1)
ERYTHROCYTE [DISTWIDTH] IN BLOOD BY AUTOMATED COUNT: 13.3 % (ref 11.9–14.6)
GLOBULIN SER CALC-MCNC: 3.9 G/DL (ref 2.8–4.5)
GLUCOSE SERPL-MCNC: 153 MG/DL (ref 65–100)
HCT VFR BLD AUTO: 39.7 % (ref 35.8–46.3)
HGB BLD-MCNC: 13.3 G/DL (ref 11.7–15.4)
LIPASE SERPL-CCNC: 204 U/L (ref 73–393)
MAGNESIUM SERPL-MCNC: 2.1 MG/DL (ref 1.8–2.4)
MCH RBC QN AUTO: 31.5 PG (ref 26.1–32.9)
MCHC RBC AUTO-ENTMCNC: 33.5 G/DL (ref 31.4–35)
MCV RBC AUTO: 94.1 FL (ref 82–102)
NRBC # BLD: 0 K/UL (ref 0–0.2)
PLATELET # BLD AUTO: 258 K/UL (ref 150–450)
PMV BLD AUTO: 10.7 FL (ref 9.4–12.3)
POTASSIUM SERPL-SCNC: 3.6 MMOL/L (ref 3.5–5.1)
PROT SERPL-MCNC: 7.5 G/DL (ref 6.3–8.2)
RBC # BLD AUTO: 4.22 M/UL (ref 4.05–5.2)
SODIUM SERPL-SCNC: 141 MMOL/L (ref 133–143)
TROPONIN I SERPL HS-MCNC: 6.8 PG/ML (ref 0–14)
TROPONIN I SERPL HS-MCNC: 7.3 PG/ML (ref 0–14)
WBC # BLD AUTO: 11.2 K/UL (ref 4.3–11.1)

## 2022-12-10 PROCEDURE — 96376 TX/PRO/DX INJ SAME DRUG ADON: CPT

## 2022-12-10 PROCEDURE — 96374 THER/PROPH/DIAG INJ IV PUSH: CPT

## 2022-12-10 PROCEDURE — 83690 ASSAY OF LIPASE: CPT

## 2022-12-10 PROCEDURE — 71045 X-RAY EXAM CHEST 1 VIEW: CPT

## 2022-12-10 PROCEDURE — 6360000002 HC RX W HCPCS: Performed by: EMERGENCY MEDICINE

## 2022-12-10 PROCEDURE — 2580000003 HC RX 258: Performed by: EMERGENCY MEDICINE

## 2022-12-10 PROCEDURE — 6370000000 HC RX 637 (ALT 250 FOR IP): Performed by: EMERGENCY MEDICINE

## 2022-12-10 PROCEDURE — 85027 COMPLETE CBC AUTOMATED: CPT

## 2022-12-10 PROCEDURE — 80053 COMPREHEN METABOLIC PANEL: CPT

## 2022-12-10 PROCEDURE — 83735 ASSAY OF MAGNESIUM: CPT

## 2022-12-10 PROCEDURE — 93005 ELECTROCARDIOGRAM TRACING: CPT | Performed by: EMERGENCY MEDICINE

## 2022-12-10 PROCEDURE — 99285 EMERGENCY DEPT VISIT HI MDM: CPT

## 2022-12-10 PROCEDURE — 84484 ASSAY OF TROPONIN QUANT: CPT

## 2022-12-10 PROCEDURE — 96375 TX/PRO/DX INJ NEW DRUG ADDON: CPT

## 2022-12-10 RX ORDER — MAGNESIUM HYDROXIDE/ALUMINUM HYDROXICE/SIMETHICONE 120; 1200; 1200 MG/30ML; MG/30ML; MG/30ML
30 SUSPENSION ORAL
Status: COMPLETED | OUTPATIENT
Start: 2022-12-10 | End: 2022-12-10

## 2022-12-10 RX ORDER — METOCLOPRAMIDE HYDROCHLORIDE 5 MG/ML
10 INJECTION INTRAMUSCULAR; INTRAVENOUS ONCE
Status: COMPLETED | OUTPATIENT
Start: 2022-12-10 | End: 2022-12-10

## 2022-12-10 RX ORDER — MORPHINE SULFATE 4 MG/ML
4 INJECTION INTRAVENOUS ONCE
Status: COMPLETED | OUTPATIENT
Start: 2022-12-10 | End: 2022-12-10

## 2022-12-10 RX ORDER — PROMETHAZINE HYDROCHLORIDE 25 MG/1
25 TABLET ORAL 3 TIMES DAILY PRN
Qty: 21 TABLET | Refills: 0 | Status: SHIPPED | OUTPATIENT
Start: 2022-12-10 | End: 2022-12-17

## 2022-12-10 RX ORDER — 0.9 % SODIUM CHLORIDE 0.9 %
1000 INTRAVENOUS SOLUTION INTRAVENOUS ONCE
Status: COMPLETED | OUTPATIENT
Start: 2022-12-10 | End: 2022-12-10

## 2022-12-10 RX ORDER — PROCHLORPERAZINE EDISYLATE 5 MG/ML
10 INJECTION INTRAMUSCULAR; INTRAVENOUS ONCE
Status: COMPLETED | OUTPATIENT
Start: 2022-12-10 | End: 2022-12-10

## 2022-12-10 RX ORDER — LIDOCAINE HYDROCHLORIDE 20 MG/ML
15 SOLUTION OROPHARYNGEAL
Status: COMPLETED | OUTPATIENT
Start: 2022-12-10 | End: 2022-12-10

## 2022-12-10 RX ORDER — TRAMADOL HYDROCHLORIDE 50 MG/1
50 TABLET ORAL 3 TIMES DAILY PRN
Qty: 11 TABLET | Refills: 0 | Status: SHIPPED | OUTPATIENT
Start: 2022-12-10 | End: 2022-12-13

## 2022-12-10 RX ORDER — ONDANSETRON 2 MG/ML
4 INJECTION INTRAMUSCULAR; INTRAVENOUS
Status: COMPLETED | OUTPATIENT
Start: 2022-12-10 | End: 2022-12-10

## 2022-12-10 RX ORDER — DIPHENHYDRAMINE HYDROCHLORIDE 50 MG/ML
12.5 INJECTION INTRAMUSCULAR; INTRAVENOUS ONCE
Status: COMPLETED | OUTPATIENT
Start: 2022-12-10 | End: 2022-12-10

## 2022-12-10 RX ADMIN — DIPHENHYDRAMINE HYDROCHLORIDE 12.5 MG: 50 INJECTION, SOLUTION INTRAMUSCULAR; INTRAVENOUS at 09:01

## 2022-12-10 RX ADMIN — ALUMINUM HYDROXIDE, MAGNESIUM HYDROXIDE, DIMETHICONE 30 ML: 200; 200; 20 LIQUID ORAL at 07:30

## 2022-12-10 RX ADMIN — PROCHLORPERAZINE EDISYLATE 10 MG: 5 INJECTION INTRAMUSCULAR; INTRAVENOUS at 07:26

## 2022-12-10 RX ADMIN — METOCLOPRAMIDE 10 MG: 5 INJECTION, SOLUTION INTRAMUSCULAR; INTRAVENOUS at 09:01

## 2022-12-10 RX ADMIN — MORPHINE SULFATE 4 MG: 4 INJECTION INTRAVENOUS at 07:28

## 2022-12-10 RX ADMIN — LIDOCAINE HYDROCHLORIDE 15 ML: 20 SOLUTION TOPICAL at 07:30

## 2022-12-10 RX ADMIN — MORPHINE SULFATE 4 MG: 4 INJECTION INTRAVENOUS at 09:01

## 2022-12-10 RX ADMIN — ONDANSETRON 4 MG: 2 INJECTION INTRAMUSCULAR; INTRAVENOUS at 07:26

## 2022-12-10 RX ADMIN — SODIUM CHLORIDE 1000 ML: 9 INJECTION, SOLUTION INTRAVENOUS at 07:26

## 2022-12-10 ASSESSMENT — PAIN DESCRIPTION - LOCATION
LOCATION: ABDOMEN
LOCATION: ABDOMEN
LOCATION: ABDOMEN;CHEST

## 2022-12-10 ASSESSMENT — PAIN DESCRIPTION - PAIN TYPE: TYPE: ACUTE PAIN

## 2022-12-10 ASSESSMENT — ENCOUNTER SYMPTOMS
COUGH: 0
WHEEZING: 0
NAUSEA: 1
SHORTNESS OF BREATH: 0
SORE THROAT: 0
ABDOMINAL PAIN: 1
VOMITING: 1
COLOR CHANGE: 0
DIARRHEA: 0
EYE REDNESS: 0

## 2022-12-10 ASSESSMENT — PAIN SCALES - GENERAL
PAINLEVEL_OUTOF10: 9
PAINLEVEL_OUTOF10: 10
PAINLEVEL_OUTOF10: 10

## 2022-12-10 ASSESSMENT — PAIN - FUNCTIONAL ASSESSMENT
PAIN_FUNCTIONAL_ASSESSMENT: 0-10
PAIN_FUNCTIONAL_ASSESSMENT: 0-10

## 2022-12-10 ASSESSMENT — PAIN DESCRIPTION - FREQUENCY: FREQUENCY: CONTINUOUS

## 2022-12-10 ASSESSMENT — LIFESTYLE VARIABLES
HOW MANY STANDARD DRINKS CONTAINING ALCOHOL DO YOU HAVE ON A TYPICAL DAY: PATIENT DOES NOT DRINK
HOW OFTEN DO YOU HAVE A DRINK CONTAINING ALCOHOL: NEVER

## 2022-12-10 ASSESSMENT — PAIN DESCRIPTION - ONSET: ONSET: SUDDEN

## 2022-12-10 NOTE — ED TRIAGE NOTES
Pt arrives A&Ox4 ambulatory. Pt c/o emesis since last night with chest pain and SOB. Pt took Pepcid w/o relief.

## 2022-12-10 NOTE — ED NOTES
I have reviewed discharge instructions with the patient. The patient verbalized understanding. Patient left ED via Discharge Method: ambulatory to Home with (family/friend). Opportunity for questions and clarification provided. Patient given 0 scripts. No esign x2 prescriptions sent to pharmacy         To continue your aftercare when you leave the hospital, you may receive an automated call from our care team to check in on how you are doing. This is a free service and part of our promise to provide the best care and service to meet your aftercare needs.  If you have questions, or wish to unsubscribe from this service please call [phone]. Thank you for Choosing our [facility] Emergency Department.       Marla Middleton RN  12/10/22 0197

## 2022-12-10 NOTE — ED PROVIDER NOTES
Emergency Department Provider Note                   PCP:                None Provider               Age: 65 y.o. Sex: female       ICD-10-CM    1. Abdominal pain, epigastric  R10.13 traMADol (ULTRAM) 50 MG tablet          DISPOSITION Decision To Discharge 12/10/2022 10:51:28 AM        MDM  Number of Diagnoses or Management Options  Abdominal pain, epigastric  Diagnosis management comments: I will check her basic blood work, EKG, cardiac enzymes, and treat with fluids, pain medicine, Compazine. She received p.o. meds and Zofran through triage. EKG review by ER doctor:  Normal sinus rhythm  No acute ischemic ST segment changes  No QTC prolongation  Rate of: 60         ED Course as of 12/10/22 1054   Sat Dec 10, 2022   1050 Her repeat troponin is negative. Her blood work has otherwise been unremarkable. Her x-ray is negative. No think she has anything urgent or emergent. She is feeling some better.   I will discharge her home. [AC]      ED Course User Index  [AC] Carrie Sánchez MD        Orders Placed This Encounter   Procedures    XR CHEST PORTABLE    CBC    Comprehensive Metabolic Panel    Troponin    Lipase    Magnesium    Cardiac Monitor    Pulse Oximetry    EKG 12 Lead    Saline lock IV        Medications   ondansetron (ZOFRAN) injection 4 mg (4 mg IntraVENous Given 12/10/22 0726)   aluminum & magnesium hydroxide-simethicone (MAALOX) 200-200-20 MG/5ML suspension 30 mL (30 mLs Oral Given 12/10/22 0730)   lidocaine viscous hcl (XYLOCAINE) 2 % solution 15 mL (15 mLs Mouth/Throat Given 12/10/22 0730)   prochlorperazine (COMPAZINE) injection 10 mg (10 mg IntraVENous Given 12/10/22 0726)   morphine injection 4 mg (4 mg IntraVENous Given 12/10/22 0728)   0.9 % sodium chloride bolus (1,000 mLs IntraVENous New Bag 12/10/22 0726)   morphine injection 4 mg (4 mg IntraVENous Given 12/10/22 0901)   metoclopramide (REGLAN) injection 10 mg (10 mg IntraVENous Given 12/10/22 0901)   diphenhydrAMINE (BENADRYL) injection 12.5 mg (12.5 mg IntraVENous Given 12/10/22 0901)       New Prescriptions    PROMETHAZINE (PHENERGAN) 25 MG TABLET    Take 1 tablet by mouth 3 times daily as needed for Nausea    TRAMADOL (ULTRAM) 50 MG TABLET    Take 1 tablet by mouth 3 times daily as needed for Pain for up to 3 days. Laura Mario is a 65 y.o. female who presents to the Emergency Department with chief complaint of    Chief Complaint   Patient presents with    Chest Pain      67-year-old lady presents with concerns about pain in her epigastric region that radiates into her chest.  With this pain she has had nausea and vomiting that started around midnight last night. She denies any fevers. She has had no diarrhea no blood in her bowels. She denies any recent cough or shortness of breath. No other associated symptoms. Elements of this note were created using speech recognition software. As such, errors of speech recognition may be present. Review of Systems   Constitutional:  Negative for activity change, chills and fever. HENT:  Negative for congestion and sore throat. Eyes:  Negative for redness and visual disturbance. Respiratory:  Negative for cough, shortness of breath and wheezing. Cardiovascular:  Negative for chest pain and palpitations. Gastrointestinal:  Positive for abdominal pain, nausea and vomiting. Negative for diarrhea. Endocrine: Negative for polydipsia and polyuria. Genitourinary:  Negative for flank pain and hematuria. Musculoskeletal:  Negative for joint swelling and myalgias. Skin:  Negative for color change and rash. Allergic/Immunologic: Negative for immunocompromised state. Neurological:  Negative for dizziness, light-headedness and headaches. Hematological:  Negative for adenopathy. Psychiatric/Behavioral:  Negative for confusion.       Past Medical History:   Diagnosis Date    Arthritis         Past Surgical History:   Procedure Laterality Date    EYE SURGERY History reviewed. No pertinent family history. Social History     Socioeconomic History    Marital status: Single     Spouse name: None    Number of children: None    Years of education: None    Highest education level: None   Tobacco Use    Smoking status: Former     Packs/day: 0.50     Years: 40.00     Pack years: 20.00     Types: Cigarettes     Quit date: 2019     Years since quittin.9    Smokeless tobacco: Never   Substance and Sexual Activity    Alcohol use: Not Currently    Drug use: Never         Penicillins     Previous Medications    ALPRAZOLAM (XANAX) 0.5 MG TABLET    Take 0.5 mg by mouth daily as needed. AMPHETAMINE-DEXTROAMPHETAMINE (ADDERALL) 20 MG TABLET    Take 1 tablet by mouth daily. CELECOXIB (CELEBREX) 200 MG CAPSULE    Take 200 mg by mouth daily    FAMOTIDINE (PEPCID) 20 MG TABLET    Take 1 tablet by mouth 2 times daily    GABAPENTIN (NEURONTIN) 300 MG CAPSULE    Take by mouth. Vitals signs and nursing note reviewed. Patient Vitals for the past 4 hrs:   Pulse Resp BP SpO2   12/10/22 0905 71 22 -- 98 %   12/10/22 0859 -- -- 137/63 98 %          Physical Exam  Vitals and nursing note reviewed. Constitutional:       Appearance: Normal appearance. HENT:      Head: Normocephalic and atraumatic. Mouth/Throat:      Mouth: Mucous membranes are moist.   Eyes:      General:         Right eye: No discharge. Left eye: No discharge. Cardiovascular:      Rate and Rhythm: Normal rate and regular rhythm. Pulses: Normal pulses. Pulmonary:      Effort: Pulmonary effort is normal.      Breath sounds: Normal breath sounds. No wheezing. Abdominal:      General: Bowel sounds are normal.      Tenderness: There is no abdominal tenderness. There is no guarding or rebound. Musculoskeletal:      Right lower leg: No edema. Left lower leg: No edema. Lymphadenopathy:      Cervical: No cervical adenopathy. Skin:     Coloration: Skin is not jaundiced. Neurological:      General: No focal deficit present. Mental Status: She is alert and oriented to person, place, and time. Mental status is at baseline. Procedures    Results for orders placed or performed during the hospital encounter of 12/10/22   XR CHEST PORTABLE    Narrative    EXAM: Chest x-ray. INDICATION: Chest pain. COMPARISON: Prior chest x-ray on November 7, 2022. TECHNIQUE: Single frontal view. FINDINGS: The lungs are clear. The cardiac size, mediastinal contour and  pulmonary vasculature are normal. No pneumothorax or pleural effusion is seen. The bones are intact. Impression    Normal chest x-ray.    CBC   Result Value Ref Range    WBC 11.2 (H) 4.3 - 11.1 K/uL    RBC 4.22 4.05 - 5.2 M/uL    Hemoglobin 13.3 11.7 - 15.4 g/dL    Hematocrit 39.7 35.8 - 46.3 %    MCV 94.1 82.0 - 102.0 FL    MCH 31.5 26.1 - 32.9 PG    MCHC 33.5 31.4 - 35.0 g/dL    RDW 13.3 11.9 - 14.6 %    Platelets 231 123 - 506 K/uL    MPV 10.7 9.4 - 12.3 FL    nRBC 0.00 0.0 - 0.2 K/uL   Comprehensive Metabolic Panel   Result Value Ref Range    Sodium 141 133 - 143 mmol/L    Potassium 3.6 3.5 - 5.1 mmol/L    Chloride 110 101 - 110 mmol/L    CO2 27 21 - 32 mmol/L    Anion Gap 4 2 - 11 mmol/L    Glucose 153 (H) 65 - 100 mg/dL    BUN 16 8 - 23 MG/DL    Creatinine 0.73 0.6 - 1.0 MG/DL    Est, Glom Filt Rate >60 >60 ml/min/1.73m2    Calcium 9.7 8.3 - 10.4 MG/DL    Total Bilirubin 0.3 0.2 - 1.1 MG/DL    ALT 28 12 - 65 U/L    AST 16 15 - 37 U/L    Alk Phosphatase 105 50 - 130 U/L    Total Protein 7.5 6.3 - 8.2 g/dL    Albumin 3.6 3.2 - 4.6 g/dL    Globulin 3.9 2.8 - 4.5 g/dL    Albumin/Globulin Ratio 0.9 0.4 - 1.6     Troponin   Result Value Ref Range    Troponin, High Sensitivity 6.8 0 - 14 pg/mL   Troponin   Result Value Ref Range    Troponin, High Sensitivity 7.3 0 - 14 pg/mL   Lipase   Result Value Ref Range    Lipase 204 73 - 393 U/L   Magnesium   Result Value Ref Range    Magnesium 2.1 1.8 - 2.4 mg/dL   EKG 12 Lead   Result Value Ref Range    Ventricular Rate 62 BPM    Atrial Rate 62 BPM    P-R Interval 154 ms    QRS Duration 78 ms    Q-T Interval 398 ms    QTc Calculation (Bazett) 403 ms    P Axis 69 degrees    R Axis 54 degrees    T Axis 15 degrees    Diagnosis Normal sinus rhythm with sinus arrhythmia         XR CHEST PORTABLE   Final Result   Normal chest x-ray. Voice dictation software was used during the making of this note. This software is not perfect and grammatical and other typographical errors may be present. This note has not been completely proofread for errors.        Loco Smith MD  12/10/22 5009

## 2022-12-11 LAB
EKG ATRIAL RATE: 62 BPM
EKG DIAGNOSIS: NORMAL
EKG P AXIS: 69 DEGREES
EKG P-R INTERVAL: 154 MS
EKG Q-T INTERVAL: 398 MS
EKG QRS DURATION: 78 MS
EKG QTC CALCULATION (BAZETT): 403 MS
EKG R AXIS: 54 DEGREES
EKG T AXIS: 15 DEGREES
EKG VENTRICULAR RATE: 62 BPM

## 2023-10-18 ENCOUNTER — TELEPHONE (OUTPATIENT)
Dept: SLEEP MEDICINE | Age: 63
End: 2023-10-18

## 2023-10-18 NOTE — TELEPHONE ENCOUNTER
Patient called and was unaware of her appointment, she reports no issues with her CPAP, I gave her the number to call and reschedule for a follow-up appointment.   To be seen face-to-face within 90 days of getting [employer] good

## 2023-11-09 NOTE — PROGRESS NOTES
) BiLevel Unit  (     ) Auto BiLevel Unit  (     ) ASV   (     ) Bilevel ST      Length of need: 12 months    Pressure:  15-20 cmH20  EPR: 3     Starting Ramp Pressure:   cm H20  Ramp Time: min      Patient had a diagnostic Apnea Hypopnea Index (AHI) of :  0.8  *SUPPLIES* Replace all as needed, or per coverage guidelines     Masks Type:  ( x   ) -Full Face Mask (1 per 3 mon)  (  x  ) -Full Mask (1 per month) Interface/Cushion      (  ) -Nasal Mask (1 per 3 mon)  (  ) - Nasal Mask (1 per month) Interface/Cushion  (     ) -Pillow (2 per mon)  (     ) -Obkkuewwx (1 per 6 mon)            Other Supplies:    (   X  )-Ekyczpcr (1 per 6 mon)  (   X  )-Squpuw Tubing (1 per 3 mon)  (   X  )- Disposable Filter (2 per mon)  (   x  )-Rnetws Humidifier (1 per year)     ( x    )-Kozgbpuka (sometimes used with Full Face Mask) (1 per 6 mos)  (    )-Tubing-without heat (1 per 3 mos)  (     )-Non-Disposable Filter (1 per 6 mos)  (  x   )-Water Chamber (1 per 6 mos)  (     )-Humidifier non-heated (1 per 5 yrs)      Signed Date: 11/10/2023  Electronically Signed By: ARINA Mojica CNP  Electronically Dated:  11/10/2023             Collaborating Physician: Dr. Lissa Guzman office visit was spent  reviewing test results, prognosis, importance of compliance, education about disease process, benefits of medications, instructions for management of acute flare-ups, and follow up plans. Total time spent with patient was 20 minutes.         ARINA Mojica CNP  Electronically signed

## 2023-11-10 ENCOUNTER — OFFICE VISIT (OUTPATIENT)
Dept: SLEEP MEDICINE | Age: 63
End: 2023-11-10

## 2023-11-10 VITALS
HEIGHT: 66 IN | BODY MASS INDEX: 43.39 KG/M2 | WEIGHT: 270 LBS | SYSTOLIC BLOOD PRESSURE: 122 MMHG | DIASTOLIC BLOOD PRESSURE: 70 MMHG | HEART RATE: 56 BPM | OXYGEN SATURATION: 100 % | TEMPERATURE: 97.2 F

## 2023-11-10 DIAGNOSIS — G47.33 OSA (OBSTRUCTIVE SLEEP APNEA): Primary | ICD-10-CM

## 2023-11-10 DIAGNOSIS — E66.01 CLASS 3 SEVERE OBESITY DUE TO EXCESS CALORIES WITHOUT SERIOUS COMORBIDITY WITH BODY MASS INDEX (BMI) OF 40.0 TO 44.9 IN ADULT (HCC): ICD-10-CM

## 2023-11-10 PROCEDURE — 99213 OFFICE O/P EST LOW 20 MIN: CPT | Performed by: NURSE PRACTITIONER

## 2023-11-10 ASSESSMENT — SLEEP AND FATIGUE QUESTIONNAIRES
HOW LIKELY ARE YOU TO NOD OFF OR FALL ASLEEP WHILE SITTING AND READING: 2
HOW LIKELY ARE YOU TO NOD OFF OR FALL ASLEEP WHILE WATCHING TV: 1
HOW LIKELY ARE YOU TO NOD OFF OR FALL ASLEEP WHILE LYING DOWN TO REST IN THE AFTERNOON WHEN CIRCUMSTANCES PERMIT: 3
ESS TOTAL SCORE: 8
HOW LIKELY ARE YOU TO NOD OFF OR FALL ASLEEP WHEN YOU ARE A PASSENGER IN A CAR FOR AN HOUR WITHOUT A BREAK: 0
HOW LIKELY ARE YOU TO NOD OFF OR FALL ASLEEP WHILE SITTING AND TALKING TO SOMEONE: 0
HOW LIKELY ARE YOU TO NOD OFF OR FALL ASLEEP IN A CAR, WHILE STOPPED FOR A FEW MINUTES IN TRAFFIC: 0
HOW LIKELY ARE YOU TO NOD OFF OR FALL ASLEEP WHILE SITTING INACTIVE IN A PUBLIC PLACE: 1
HOW LIKELY ARE YOU TO NOD OFF OR FALL ASLEEP WHILE SITTING QUIETLY AFTER LUNCH WITHOUT ALCOHOL: 1

## 2023-11-10 NOTE — PATIENT INSTRUCTIONS
Continue CPAP 15-20 cm H2O with nightly compliance  New CPAP supplies ordered  Recommendations as above  Follow-up in 1 year or sooner if needed